# Patient Record
Sex: FEMALE | Race: WHITE | Employment: UNEMPLOYED | URBAN - METROPOLITAN AREA
[De-identification: names, ages, dates, MRNs, and addresses within clinical notes are randomized per-mention and may not be internally consistent; named-entity substitution may affect disease eponyms.]

---

## 2017-01-19 ENCOUNTER — HOSPITAL ENCOUNTER (EMERGENCY)
Age: 37
Discharge: HOME | End: 2017-01-20
Attending: EMERGENCY MEDICINE | Admitting: EMERGENCY MEDICINE
Payer: MEDICAID

## 2017-01-19 DIAGNOSIS — L02.01 FACIAL ABSCESS: Primary | ICD-10-CM

## 2017-01-19 PROCEDURE — 99283 EMERGENCY DEPT VISIT LOW MDM: CPT

## 2017-01-19 PROCEDURE — 75810000289 HC I&D ABSCESS SIMP/COMP/MULT

## 2017-01-20 VITALS
OXYGEN SATURATION: 99 % | TEMPERATURE: 98.1 F | RESPIRATION RATE: 14 BRPM | SYSTOLIC BLOOD PRESSURE: 115 MMHG | BODY MASS INDEX: 30.1 KG/M2 | HEIGHT: 71 IN | WEIGHT: 215 LBS | HEART RATE: 72 BPM | DIASTOLIC BLOOD PRESSURE: 58 MMHG

## 2017-01-20 PROCEDURE — 74011000250 HC RX REV CODE- 250

## 2017-01-20 PROCEDURE — 74011250637 HC RX REV CODE- 250/637: Performed by: EMERGENCY MEDICINE

## 2017-01-20 RX ORDER — IBUPROFEN 400 MG/1
800 TABLET ORAL
Status: COMPLETED | OUTPATIENT
Start: 2017-01-20 | End: 2017-01-20

## 2017-01-20 RX ORDER — SULFAMETHOXAZOLE AND TRIMETHOPRIM 800; 160 MG/1; MG/1
1 TABLET ORAL ONCE
Status: COMPLETED | OUTPATIENT
Start: 2017-01-20 | End: 2017-01-20

## 2017-01-20 RX ORDER — LIDOCAINE HYDROCHLORIDE 10 MG/ML
INJECTION, SOLUTION EPIDURAL; INFILTRATION; INTRACAUDAL; PERINEURAL
Status: COMPLETED
Start: 2017-01-20 | End: 2017-01-20

## 2017-01-20 RX ORDER — OXYCODONE AND ACETAMINOPHEN 5; 325 MG/1; MG/1
1-2 TABLET ORAL
Qty: 16 TAB | Refills: 0 | Status: SHIPPED | OUTPATIENT
Start: 2017-01-20

## 2017-01-20 RX ORDER — LIDOCAINE HYDROCHLORIDE 10 MG/ML
5 INJECTION, SOLUTION EPIDURAL; INFILTRATION; INTRACAUDAL; PERINEURAL ONCE
Status: COMPLETED | OUTPATIENT
Start: 2017-01-20 | End: 2017-01-20

## 2017-01-20 RX ORDER — SULFAMETHOXAZOLE AND TRIMETHOPRIM 800; 160 MG/1; MG/1
1 TABLET ORAL 2 TIMES DAILY
Qty: 14 TAB | Refills: 0 | Status: SHIPPED | OUTPATIENT
Start: 2017-01-20 | End: 2017-01-27

## 2017-01-20 RX ORDER — ONDANSETRON 4 MG/1
4 TABLET, ORALLY DISINTEGRATING ORAL
Qty: 14 TAB | Refills: 0 | Status: SHIPPED | OUTPATIENT
Start: 2017-01-20

## 2017-01-20 RX ADMIN — SULFAMETHOXAZOLE AND TRIMETHOPRIM 1 TABLET: 800; 160 TABLET ORAL at 00:16

## 2017-01-20 RX ADMIN — IBUPROFEN 800 MG: 400 TABLET ORAL at 00:16

## 2017-01-20 RX ADMIN — LIDOCAINE HYDROCHLORIDE 5 ML: 10 INJECTION, SOLUTION EPIDURAL; INFILTRATION; INTRACAUDAL; PERINEURAL at 00:16

## 2017-01-20 NOTE — ED PROVIDER NOTES
HPI Comments: Pt c/o left facial abscess. Says x 1-2 weeks, swelling/redness next to left upper lip. No bleeding/drainage. Mod/severe pain. No md eval for same. H/o freq abscess. No nausea. No weakness. Patient is a 29 y.o. female presenting with facial swelling. Facial Swelling    Pertinent negatives include no vomiting. Past Medical History:   Diagnosis Date    Hypothyroid        Past Surgical History:   Procedure Laterality Date    Hx thyroidectomy      Hx tubal ligation           History reviewed. No pertinent family history. Social History     Social History    Marital status: SINGLE     Spouse name: N/A    Number of children: N/A    Years of education: N/A     Occupational History    Not on file. Social History Main Topics    Smoking status: Current Every Day Smoker     Packs/day: 0.50    Smokeless tobacco: Not on file    Alcohol use No    Drug use: No    Sexual activity: Not on file     Other Topics Concern    Not on file     Social History Narrative         ALLERGIES: Review of patient's allergies indicates no known allergies. Review of Systems   Constitutional: Negative for fever. HENT: Positive for facial swelling. Negative for congestion. Respiratory: Negative for cough and shortness of breath. Cardiovascular: Negative for chest pain. Gastrointestinal: Negative for abdominal pain and vomiting. Musculoskeletal: Negative for back pain. Skin: Positive for rash. Neurological: Negative for light-headedness. All other systems reviewed and are negative. Vitals:    01/19/17 2333 01/20/17 0021   BP: 115/58    Pulse: 72    Resp: 14    Temp: 98.1 °F (36.7 °C)    SpO2: 99% 99%   Weight: 97.5 kg (215 lb)    Height: 5' 11\" (1.803 m)             Physical Exam   Constitutional: She is oriented to person, place, and time. She appears well-developed. HENT:   Head: Normocephalic.    Mouth/Throat: Oropharynx is clear and moist.   Eyes: Pupils are equal, round, and reactive to light. Neck: Normal range of motion. Cardiovascular: Normal rate and normal heart sounds. No murmur heard. Pulmonary/Chest: Effort normal. She has no wheezes. She has no rales. Abdominal: Soft. There is no tenderness. Musculoskeletal: Normal range of motion. She exhibits no edema. Neurological: She is alert and oriented to person, place, and time. Skin: Skin is warm and dry. Rash (+ 2x2 cm area of swelling/erythema/ttp/induration left cheek just lat to upper lip. no lip involvement. ) noted. Nursing note and vitals reviewed. [facility]  ED Course       I&D Abcess Complex  Date/Time: 1/20/2017 5:54 AM  Performed by: Gricelda Yu  Authorized by: Judy NORRIS     Consent:     Consent obtained:  Written    Consent given by:  Patient    Risks discussed:  Bleeding, damage to other organs, incomplete drainage and infection (scarring)    Alternatives discussed:  Delayed treatment, observation and referral  Location:     Type:  Abscess    Location:  Head    Head location:  Face  Pre-procedure details:     Skin preparation:  Betadine  Anesthesia (see MAR for exact dosages): Anesthesia method:  Local infiltration    Local anesthetic:  Lidocaine 1% w/o epi  Procedure type:     Complexity:  Complex  Procedure details:     Needle aspiration: yes (but no sig purulence obtained)      Needle size:  18 G    Incision types:  Stab incision    Incision depth:  Subcutaneous    Scalpel blade:  11    Wound management:  Probed and deloculated and extensive cleaning    Drainage:  Purulent    Drainage amount: Moderate    Wound treatment:  Wound left open    Packing materials:  1/4 in gauze  Post-procedure details:     Patient tolerance of procedure:   Tolerated well, no immediate complications        Vitals:  Patient Vitals for the past 12 hrs:   Temp Pulse Resp BP SpO2   01/20/17 0021 - - - - 99 %   01/19/17 2333 98.1 °F (36.7 °C) 72 14 115/58 99 %         Medications ordered:   Medications ibuprofen (MOTRIN) tablet 800 mg (800 mg Oral Given 1/20/17 0016)   trimethoprim-sulfamethoxazole (BACTRIM DS, SEPTRA DS) 160-800 mg per tablet 1 Tab (1 Tab Oral Given 1/20/17 0016)   lidocaine (PF) (XYLOCAINE) 10 mg/mL (1 %) injection 5 mL (5 mL SubCUTAneous Given by Provider 1/20/17 0016)         Lab findings:  No results found for this or any previous visit (from the past 12 hour(s)). X-Ray, CT or other radiology findings or impressions:  No orders to display       Progress notes, Consult notes or additional Procedure notes:   Long d/w pt regarding risks/benefits of i and d including facial scarring, but sig abscess, sig pain/swelling, pt strongly agrees w i and d. Markedly improved after tx. No emc. Stable for dc and close f/u. No s/o bacteremia/sepsis. Disposition:  Diagnosis:   1. Facial abscess        Disposition: home    Follow-up Information     Follow up With Details Comments [address] Lepe Schedule an appointment as soon as possible for a visit in 2 days  [address] [place] [address] Way  [address] [place] [address]  [phone]           Discharge Medication List as of 1/20/2017 12:45 AM      START taking these medications    Details   trimethoprim-sulfamethoxazole (BACTRIM DS) 160-800 mg per tablet Take 1 Tab by mouth two (2) times a day for 7 days. , Print, Disp-14 Tab, R-0      oxyCODONE-acetaminophen (PERCOCET) 5-325 mg per tablet Take 1-2 Tabs by mouth every six (6) hours as needed for Pain. Max Daily Amount: 8 Tabs., Print, Disp-16 Tab, R-0      ondansetron (ZOFRAN ODT) 4 mg disintegrating tablet Take 1 Tab by mouth every eight (8) hours as needed for Nausea. , Print, Disp-14 Tab, R-0         CONTINUE these medications which have NOT CHANGED    Details   LEVOTHYROXINE SODIUM (SYNTHROID PO) Take  by mouth., Historical Med

## 2017-01-20 NOTE — ED NOTES
Discharge instructions reviewed with patient. Patient voices understanding. Patient advised to follow up as directed on discharge instructions. Patient denies questions, needs or concerns at discharge regarding discharge instructions. Advised patient of need to update demographic information with registration prior to departing ER. Patient voiced understanding. No s/sx of distress noted. Armband removed and placed in shredder box.

## 2017-01-20 NOTE — ED TRIAGE NOTES
Swelling noted to the left upper lip. Denies trauma. Had similar issue with her nose recently. This swelling has been worsening for the past week.

## 2017-01-20 NOTE — DISCHARGE INSTRUCTIONS
Return for pain, fever, redness, swelling, shortness of breath, vomiting, decreased fluid intake, weakness, numbness, dizziness, or any change or concerns. Packing needs to be removed in 1-2 days in recheck.

## 2017-01-20 NOTE — ED NOTES
Patient c/o swelling/infection to left side of upper lip. Patient denies injury. Patient understands plan of care. Will continue to monitor.

## 2017-01-21 ENCOUNTER — HOSPITAL ENCOUNTER (EMERGENCY)
Age: 37
Discharge: HOME | End: 2017-01-21
Attending: EMERGENCY MEDICINE
Payer: MEDICAID

## 2017-01-21 VITALS
OXYGEN SATURATION: 100 % | WEIGHT: 215 LBS | TEMPERATURE: 97.9 F | HEART RATE: 86 BPM | RESPIRATION RATE: 18 BRPM | BODY MASS INDEX: 30.1 KG/M2 | SYSTOLIC BLOOD PRESSURE: 117 MMHG | HEIGHT: 71 IN | DIASTOLIC BLOOD PRESSURE: 70 MMHG

## 2017-01-21 DIAGNOSIS — L02.01 ABSCESS OF LEFT EXTERNAL CHEEK: Primary | ICD-10-CM

## 2017-01-21 DIAGNOSIS — Z51.89 WOUND CHECK, ABSCESS: ICD-10-CM

## 2017-01-21 PROCEDURE — 75810000275 HC EMERGENCY DEPT VISIT NO LEVEL OF CARE

## 2017-01-22 NOTE — DISCHARGE INSTRUCTIONS
Wash with soap and water daily. Dress with small amount of bacitracin, gauze and coban. Keep affected limb elevated. Apply ice in 20 minute intervals throughout the day. Take NSAIDs such as tylenol or ibuprofin as directed for pain control. Do not take on an empty stomach. Narcotics cannot be taken while driving.

## 2017-01-22 NOTE — ED PROVIDER NOTES
HPI Comments: 32yo F here for wound check. Had I&D of asbcess to eflt cheek 2 days ago, packing is still in place. Started on abx. The swelling and redness has improved. The redness on her nose has not significantly improved. She denies fever. No other complaints. Patient is a 29 y.o. female presenting with wound check. Wound Check    Pertinent negatives include no neck pain. Past Medical History:   Diagnosis Date    Hypothyroid        Past Surgical History:   Procedure Laterality Date    Hx thyroidectomy      Hx tubal ligation           History reviewed. No pertinent family history. Social History     Social History    Marital status: SINGLE     Spouse name: N/A    Number of children: N/A    Years of education: N/A     Occupational History    Not on file. Social History Main Topics    Smoking status: Current Every Day Smoker     Packs/day: 0.50    Smokeless tobacco: Not on file    Alcohol use No    Drug use: No    Sexual activity: Not on file     Other Topics Concern    Not on file     Social History Narrative         ALLERGIES: Review of patient's allergies indicates no known allergies. Review of Systems   Constitutional: Negative for fever. HENT: Negative for facial swelling. Eyes: Negative for visual disturbance. Respiratory: Negative for shortness of breath. Cardiovascular: Negative for chest pain. Gastrointestinal: Negative for abdominal pain. Genitourinary: Negative for dysuria. Musculoskeletal: Negative for neck pain. Skin: Positive for color change and wound. Negative for rash. Neurological: Negative for dizziness. Psychiatric/Behavioral: Negative for confusion. All other systems reviewed and are negative.       Vitals:    01/21/17 2153   BP: 117/70   Pulse: 86   Resp: 18   Temp: 97.9 °F (36.6 °C)   SpO2: 100%   Weight: 97.5 kg (215 lb)   Height: 5' 11\" (1.803 m)            Physical Exam   Constitutional: She is oriented to person, place, and time. She appears well-developed and well-nourished. No distress. HENT:   Head: Normocephalic and atraumatic. Eyes: Conjunctivae are normal.   Neck: Normal range of motion. Cardiovascular: Normal rate and regular rhythm. Pulmonary/Chest: Effort normal.   Abdominal: She exhibits no distension. Musculoskeletal: Normal range of motion. Neurological: She is alert and oriented to person, place, and time. Skin: Skin is warm and dry. She is not diaphoretic. Small <1cm area of localized swelling and mild erythema. Packing in place. Small amount of pus expressed from the wound after packing removal.    1cm area of erythema to left side of nose. No palpable fluctuance. No drainage. Psychiatric: She has a normal mood and affect. Nursing note and vitals reviewed. MDM  Number of Diagnoses or Management Options  Abscess of left external cheek: established and improving  Wound check, abscess: established and improving  Diagnosis management comments: Packing removed from abscess. Expressed more drainage. Discussed treatment plan, return precautions, symptomatic relief, and expected time to improvement. All questions answered. Patient is stable for discharge and outpatient management. ED Course       Procedures           Diagnosis:   1. Abscess of left external cheek    2. Wound check, abscess          Disposition: home    Follow-up Information     Follow up With Details Comments Contact Info    Charles Chance MD In 3 days For wound re-check, If symptoms do not improve [address] [facility]  [phone]      [facility] EMERGENCY DEPT  Immediately if symptoms worsen [address] [facility]  [phone]          Patient's Medications   Start Taking    No medications on file   Continue Taking    LEVOTHYROXINE SODIUM (SYNTHROID PO)    Take  by mouth.     ONDANSETRON (ZOFRAN ODT) 4 MG DISINTEGRATING TABLET    Take 1 Tab by mouth every eight (8) hours as needed for Nausea. OXYCODONE-ACETAMINOPHEN (PERCOCET) 5-325 MG PER TABLET    Take 1-2 Tabs by mouth every six (6) hours as needed for Pain. Max Daily Amount: 8 Tabs. TRIMETHOPRIM-SULFAMETHOXAZOLE (BACTRIM DS) 160-800 MG PER TABLET    Take 1 Tab by mouth two (2) times a day for 7 days.    These Medications have changed    No medications on file   Stop Taking    No medications on file     Adalid COLLIN Farr PA-C

## 2022-12-15 ENCOUNTER — OFFICE VISIT (OUTPATIENT)
Dept: ORTHOPEDIC SURGERY | Age: 42
End: 2022-12-15
Payer: MEDICAID

## 2022-12-15 ENCOUNTER — HOSPITAL ENCOUNTER (OUTPATIENT)
Dept: OCCUPATIONAL MEDICINE | Age: 42
Discharge: HOME | End: 2022-12-15
Attending: FAMILY MEDICINE

## 2022-12-15 VITALS — HEIGHT: 71 IN | BODY MASS INDEX: 29.68 KG/M2 | RESPIRATION RATE: 14 BRPM | WEIGHT: 212 LBS

## 2022-12-15 DIAGNOSIS — S76.012A SPRAIN, GLUTEUS MEDIUS, LEFT, INITIAL ENCOUNTER: Primary | ICD-10-CM

## 2022-12-15 DIAGNOSIS — G57.03 PIRIFORMIS SYNDROME OF BOTH SIDES: ICD-10-CM

## 2022-12-15 DIAGNOSIS — S76.012A SPRAIN, GLUTEUS MEDIUS, LEFT, INITIAL ENCOUNTER: ICD-10-CM

## 2022-12-15 PROCEDURE — 99204 OFFICE O/P NEW MOD 45 MIN: CPT | Performed by: FAMILY MEDICINE

## 2022-12-15 RX ORDER — PREDNISONE 20 MG/1
TABLET ORAL
Qty: 22 TABLET | Refills: 0 | Status: SHIPPED | OUTPATIENT
Start: 2022-12-15 | End: 2022-12-30

## 2022-12-15 RX ORDER — LANOLIN ALCOHOL/MO/W.PET/CERES
CREAM (GRAM) TOPICAL
COMMUNITY

## 2022-12-15 RX ORDER — ERGOCALCIFEROL 1.25 MG/1
50000 CAPSULE ORAL
COMMUNITY

## 2022-12-15 RX ORDER — DICLOFENAC SODIUM 50 MG/1
50 TABLET, DELAYED RELEASE ORAL 2 TIMES DAILY
Qty: 60 TABLET | Refills: 1 | Status: SHIPPED | OUTPATIENT
Start: 2022-12-15

## 2022-12-15 NOTE — LETTER
12/15/2022    Patient: Domenico Mendoza   YOB: 1980   Date of Visit: 12/15/2022     Hay Jorge MD  [address] [address]  Via Fax: [phone]    Dear Hay Jorge MD,      Thank you for referring Ms. Rhys Landaverde to [place] [address]. for evaluation. My notes for this consultation are attached. If you have questions, please do not hesitate to call me. I look forward to following your patient along with you.       Sincerely,    Antionette Ramírez, DO

## 2022-12-15 NOTE — PROGRESS NOTES
HISTORY OF PRESENT ILLNESS    John Ards 1980 is a 40y.o. year old female comes in today as new patient for: low back pain    Patients symptoms have been present for about 4 weeks. Pain level 8/10 lower right>left. It has worsened with bend and certain motions as well as cough/sneeze. Patient has tried:  lidocaine patch from PCP and and aleve, motrin without benefit. It is described as pain slow onset w/o injury. Has similar [id number] but improved. Has been told bulging disc lumbar prior. IMAGING: XR lumbar pending    XR lumbar 8/19/2016 images reviewed and concur with:  Impression: No plain film evidence of acute lumbar spine fracture. No significant lumbar spondylosis. Past Surgical History:   Procedure Laterality Date    HX THYROIDECTOMY Left     HX TUBAL LIGATION       Social History     Socioeconomic History    Marital status: SINGLE   Tobacco Use    Smoking status: Former     Packs/day: 0.50     Types: Cigarettes   Vaping Use    Vaping Use: Never used   Substance and Sexual Activity    Alcohol use: Yes     Comment: occasionally    Drug use: Yes     Types: Marijuana      Current Outpatient Medications   Medication Sig Dispense Refill    ergocalciferol (Vitamin D2) 1,250 mcg (50,000 unit) capsule Take 50,000 Units by mouth. ferrous sulfate (Iron) 325 mg (65 mg iron) tablet Take  by mouth Daily (before breakfast). LEVOTHYROXINE SODIUM (SYNTHROID PO) Take  by mouth. Past Medical History:   Diagnosis Date    Hypothyroid      History reviewed. No pertinent family history. ROS:  No numb, tingle, incont, fever    Objective:  Resp 14   Ht 5' 11\" (1.803 m)   Wt 212 lb (96.2 kg)   BMI 29.57 kg/m²   NEURO:  Sensation intact to light touch. Reflexes +2/4 patellar and Achilles bilaterally. M/S:  Examined seated and supine. Slump negative. Spurling negative UE/LE Strength +5/5 bilaterally TTP Piriformis and glute medius left>right.   Paraspinal tenderness mild lumbar. Assessment/Plan:     ICD-10-CM ICD-9-CM    1. Sprain, gluteus medius, left, initial encounter  S76.012A 843.8 XR SPINE LUMB 2 OR 3 V      REFERRAL TO PHYSICAL THERAPY      predniSONE (DELTASONE) 20 mg tablet      diclofenac EC (VOLTAREN) 50 mg EC tablet      2. Piriformis syndrome of both sides  G57.03 355.0 XR SPINE LUMB 2 OR 3 V      REFERRAL TO PHYSICAL THERAPY      predniSONE (DELTASONE) 20 mg tablet      diclofenac EC (VOLTAREN) 50 mg EC tablet          Patient (or guardian if minor) verbalizes understanding of evaluation and plan. Will start HEP, PT, and Rx for prednisone tape and voltarn 50mg  as above and plan follow-up 5 weeks.